# Patient Record
Sex: FEMALE | Race: WHITE | NOT HISPANIC OR LATINO | URBAN - METROPOLITAN AREA
[De-identification: names, ages, dates, MRNs, and addresses within clinical notes are randomized per-mention and may not be internally consistent; named-entity substitution may affect disease eponyms.]

---

## 2021-05-04 ENCOUNTER — TELEPHONE (OUTPATIENT)
Dept: FAMILY MEDICINE CLINIC | Facility: HOSPITAL | Age: 31
End: 2021-05-04

## 2021-05-04 NOTE — TELEPHONE ENCOUNTER
Mom called, Carmen Burton is having a herpes flare and is asking if Dr Bill Arce will fill her acyclovir 200mg filled, 5 pills per day  I explained to Mom that Carmen Burton is not a current patient and Dr Bill Arce could not do this without seeing her  Mom wanted Dr Bill Arce to be asked   PCB mom

## 2021-05-04 NOTE — TELEPHONE ENCOUNTER
Advised that she is not our patient  She lives in [place] and is here for a visit with her mom  Asked if she could call her doctor back home to send a script here   If they cannot, she is going to make an appointment with a provider tomorrow